# Patient Record
Sex: FEMALE | Race: WHITE | NOT HISPANIC OR LATINO | Employment: FULL TIME | URBAN - METROPOLITAN AREA
[De-identification: names, ages, dates, MRNs, and addresses within clinical notes are randomized per-mention and may not be internally consistent; named-entity substitution may affect disease eponyms.]

---

## 2019-08-22 ENCOUNTER — PATIENT OUTREACH (OUTPATIENT)
Dept: SURGERY | Facility: OTHER | Age: 59
End: 2019-08-22

## 2019-08-22 NOTE — PROGRESS NOTES
Client [address] closing client from case management today 08 22 2019 due to no face to face contact in over a year

## 2019-09-05 ENCOUNTER — PATIENT OUTREACH (OUTPATIENT)
Dept: SURGERY | Facility: OTHER | Age: 59
End: 2019-09-05

## 2019-09-05 NOTE — PROGRESS NOTES
Client came to [address] [place] [address] office to complete re-intake  Cm has previously closed client due to lost to follow up, and no face to face contact in over a year  Ct is a 59year old white, heterosexual, woman who contracted HIV from her now    Ct was diagnosed in   Ct is still going to the [facility] for HIV care and states is currently taking three pills for HIV, but her doctor wants to change that to one  Ct states her doctor is waiting on ct's labs to make the medication change  Ct saw Dr Josr Banda and had labs done last week  Cm will request ct's latest labs from the [facility]  Cm and ct worked on [facility] application and cm will submit it for renewal  Ct states is in stable medical health, has no oral health concerns, no addictions, and no mental health concerns  Cm offered to refer ct to a dentist and vision doctor since it has been years since ct saw one, but ct stated she will schedule herself when ready  Ct is abstaining from risky behaviors  Ct still works at [employer] full time and has active dental, vision, medical coverage through her job ([employer])  Ct rents a room in her sisters home and pays $400/mo  Ct has no domestic violence concerns, is independent in ADL, and has a good support system  Ct has a car  Ct has no legal or nutrition issues, and has no issues with language comprehension  Ct stated she has the full week off her job and has been going to places with her family  Re-intake is complete  See media for docs and Kalyn Bernstein Form

## 2019-09-06 ENCOUNTER — PATIENT OUTREACH (OUTPATIENT)
Dept: SURGERY | Facility: OTHER | Age: 59
End: 2019-09-06

## 2019-09-12 ENCOUNTER — HOSPITAL ENCOUNTER (EMERGENCY)
Facility: HOSPITAL | Age: 59
Discharge: HOME | End: 2019-09-12
Attending: EMERGENCY MEDICINE | Admitting: EMERGENCY MEDICINE
Payer: COMMERCIAL

## 2019-09-12 VITALS
WEIGHT: 155 LBS | OXYGEN SATURATION: 97 % | TEMPERATURE: 98 F | SYSTOLIC BLOOD PRESSURE: 156 MMHG | RESPIRATION RATE: 18 BRPM | BODY MASS INDEX: 24.33 KG/M2 | HEIGHT: 67 IN | HEART RATE: 60 BPM | DIASTOLIC BLOOD PRESSURE: 69 MMHG

## 2019-09-12 DIAGNOSIS — I16.0 HYPERTENSIVE URGENCY: Primary | ICD-10-CM

## 2019-09-12 PROCEDURE — 99284 EMERGENCY DEPT VISIT MOD MDM: CPT | Performed by: EMERGENCY MEDICINE

## 2019-09-12 PROCEDURE — 99283 EMERGENCY DEPT VISIT LOW MDM: CPT

## 2019-09-12 RX ORDER — CLOPIDOGREL BISULFATE 75 MG/1
75 TABLET ORAL DAILY
COMMUNITY

## 2019-09-12 RX ORDER — AMLODIPINE BESYLATE AND BENAZEPRIL HYDROCHLORIDE 5; 10 MG/1; MG/1
1 CAPSULE ORAL DAILY
Qty: 30 CAPSULE | Refills: 0 | Status: SHIPPED | OUTPATIENT
Start: 2019-09-12

## 2019-09-12 RX ORDER — AMLODIPINE BESYLATE 5 MG/1
5 TABLET ORAL DAILY
Status: DISCONTINUED | OUTPATIENT
Start: 2019-09-13 | End: 2019-09-12 | Stop reason: HOSPADM

## 2019-09-12 RX ORDER — LISINOPRIL 10 MG/1
10 TABLET ORAL DAILY
Status: DISCONTINUED | OUTPATIENT
Start: 2019-09-13 | End: 2019-09-12 | Stop reason: HOSPADM

## 2019-09-12 RX ADMIN — LISINOPRIL 10 MG: 10 TABLET ORAL at 21:06

## 2019-09-12 RX ADMIN — AMLODIPINE BESYLATE 5 MG: 5 TABLET ORAL at 20:51

## 2019-09-13 NOTE — ED PROVIDER NOTES
History  Chief Complaint   Patient presents with    Hypertension     Per pt - c/o head pressure - reports hypertention  Pt not currently on home medications  Patient is a 59-year-old female  She does not have a history of hypertension per se, though 2 weeks ago and she was at her doctor's office her blood pressure was elevated  Today she was feeling some pressure in her head so she took her blood pressure at the blood pressure machine at [facility]  It was found to be very elevated  She presented to the emergency room for evaluation  There is no chest pain or shortness of breath  No abdominal pain or back pain  No changes in urinary output  No confusion  There are no focal motor or sensory symptoms  No visual complaints  No speech complaints  The headache is not severe  It is more of a pressure  There have been no aggravating or relieving factors  Patient is a smoker  Prior to Admission Medications   Prescriptions Last Dose Informant Patient Reported? Taking? clopidogrel (PLAVIX) 75 mg tablet   Yes Yes   Sig: Take 75 mg by mouth daily   raltegravir (ISENTRESS) 400 mg tablet   Yes Yes   Sig: Take 400 mg by mouth every 12 (twelve) hours      Facility-Administered Medications: None       Past Medical History:   Diagnosis Date    HIV disease ([facility] )     PAD (peripheral artery disease) ([facility] )        Past Surgical History:   Procedure Laterality Date    HYSTERECTOMY      VASCULAR SURGERY         History reviewed  No pertinent family history  I have reviewed and agree with the history as documented  Social History     Tobacco Use    Smoking status: Current Every Day Smoker     Packs/day: 1 00     Types: Cigarettes    Smokeless tobacco: Never Used   Substance Use Topics    Alcohol use: Not Currently    Drug use: Yes     Types: Marijuana        Review of Systems   Constitutional: Negative for chills and fever  HENT: Negative for rhinorrhea and sore throat      Eyes: Negative for pain, redness and visual disturbance  Respiratory: Negative for cough and shortness of breath  Cardiovascular: Negative for chest pain and leg swelling  Gastrointestinal: Negative for abdominal pain, diarrhea and vomiting  Endocrine: Negative for polydipsia and polyuria  Genitourinary: Negative for dysuria, frequency, hematuria, vaginal bleeding and vaginal discharge  Musculoskeletal: Negative for neck pain  There is a history of sciatica  Skin: Negative for rash and wound  Allergic/Immunologic: Negative for immunocompromised state  Neurological: Negative for weakness, numbness and headaches  Hematological: Does not bruise/bleed easily  Psychiatric/Behavioral: Negative for hallucinations and suicidal ideas  Physical Exam  Physical Exam   Constitutional: She is oriented to person, place, and time  She appears well-developed and well-nourished  No distress  HENT:   Head: Normocephalic and atraumatic  Mouth/Throat: Oropharynx is clear and moist    Eyes: Pupils are equal, round, and reactive to light  Conjunctivae and EOM are normal  Right eye exhibits no discharge  Left eye exhibits no discharge  No scleral icterus  Neck: Normal range of motion  Neck supple  Cardiovascular: Normal rate, regular rhythm, normal heart sounds and intact distal pulses  Exam reveals no gallop and no friction rub  No murmur heard  Pulmonary/Chest: Effort normal and breath sounds normal  No stridor  No respiratory distress  She has no wheezes  She has no rales  Abdominal: Soft  Bowel sounds are normal  She exhibits no distension  There is no tenderness  There is no rebound and no guarding  No pulsatile mass  Musculoskeletal: Normal range of motion  She exhibits no edema, tenderness or deformity  No CVA tenderness  No calf tenderness/palpable cords  Neurological: She is alert and oriented to person, place, and time  She has normal strength  No sensory deficit  GCS eye subscore is 4   GCS verbal subscore is 5  GCS motor subscore is 6  Skin: Skin is warm and dry  No rash noted  Psychiatric: She has a normal mood and affect  Her behavior is normal    Vitals reviewed  Vital Signs  ED Triage Vitals [09/12/19 1804]   Temperature Pulse Respirations Blood Pressure SpO2   98 °F (36 7 °C) 83 19 (!) 213/91 100 %      Temp Source Heart Rate Source Patient Position - Orthostatic VS BP Location FiO2 (%)   Oral Monitor Sitting Left arm --      Pain Score       --           Vitals:    09/12/19 1804 09/12/19 2015   BP: (!) 213/91 156/69   Pulse: 83 60   Patient Position - Orthostatic VS: Sitting Lying         Visual Acuity      ED Medications  Medications   amLODIPine (NORVASC) tablet 5 mg (has no administration in time range)   lisinopril (ZESTRIL) tablet 10 mg (has no administration in time range)       Diagnostic Studies  Results Reviewed     None                 No orders to display              Procedures  Procedures       ED Course                               MDM  Number of Diagnoses or Management Options  Diagnosis management comments: There are no signs or symptoms of malignant hypertension  This is likely hypertensive urgency  Blood pressure did improve spontaneously on arrival to the emergency room currently it is 024 systolic  Disposition  Final diagnoses:   Hypertensive urgency     Time reflects when diagnosis was documented in both MDM as applicable and the Disposition within this note     Time User Action Codes Description Comment    9/12/2019  8:26 PM Everrett Acron Add [I10] Hypertension     9/12/2019  8:26 PM Everrett Acron Remove [I10] Hypertension     9/12/2019  8:26 PM Everrett Acron Add [I16 0] Hypertensive urgency       ED Disposition     ED Disposition Condition Date/Time Comment    Discharge Stable Thu Sep 12, 2019  8:26 PM Christal Dickinson discharge to home/self care              Follow-up Information     Follow up With Specialties Details Why Contact [facility] In 1 week  [address] [id number]  [phone]            Patient's Medications   Discharge Prescriptions    AMLODIPINE-BENAZEPRIL (LOTREL 5-10) 5-10 MG PER CAPSULE    Take 1 capsule by mouth daily       Start Date: 9/12/2019 End Date: --       Order Dose: 1 capsule       Quantity: 30 capsule    Refills: 0     No discharge procedures on file      ED Provider  Electronically Signed by           Bijal Adams MD  09/12/19 2027

## 2019-09-20 ENCOUNTER — PATIENT OUTREACH (OUTPATIENT)
Dept: SURGERY | Facility: OTHER | Age: 59
End: 2019-09-20

## 2019-09-20 NOTE — PROGRESS NOTES
Cm met with Sister Hugo Farnsworth at the [facility]  Sister Hugo Farnsworth asked if cm submitted a SPBP renewal neri for ct, and cm stated yes ct should have a new card by now  Sister Hugo Farnsworth stated ct was at the ER for high blood pressure

## 2019-10-18 ENCOUNTER — PATIENT OUTREACH (OUTPATIENT)
Dept: SURGERY | Facility: OTHER | Age: 59
End: 2019-10-18

## 2019-10-18 NOTE — PROGRESS NOTES
Ct called stating her SPBP coverage was denied and she would like to come in to [facility] office to complete another application  Ct stated she has off next Thursday  Cm scheduled appt for next Thursday at 3 pm  Cm then called [employer] to find out why the application was denied  SPBP rep Zuleyma Smallwood stated ct does not have to submit an entire application all over, they just need proof of prescription coverage and proof of address to process the neri  Zuleyma Smallwood stated if ct cannot find proof of address cm can write a letter ct lives at current address  Cm called ct and explained what she needs to bring in to appt  Ct stated she sent proof of address and prescription coverage to them weeks ago  Cm stated that sometimes when submitted by mail paperwork gets lost and when cm spoke to [facility] rep today she said they still need the paperwork above  Ct stated she will bring it in on Thursday so cm can fax it out to [facility] directly

## 2019-10-24 ENCOUNTER — PATIENT OUTREACH (OUTPATIENT)
Dept: SURGERY | Facility: OTHER | Age: 59
End: 2019-10-24

## 2019-10-24 NOTE — PROGRESS NOTES
Ct came to [facility] office to provide cm with proof of address and prescription medication, so cm can submit to [facility]  Ct provided all required documentation, and cm faxed it to [facility] successfully  Cm will call [employer] tomorrow to make sure they have everything they need to reinstate ct's coverage  See media

## 2019-10-25 ENCOUNTER — PATIENT OUTREACH (OUTPATIENT)
Dept: SURGERY | Facility: OTHER | Age: 59
End: 2019-10-25

## 2019-10-25 NOTE — PROGRESS NOTES
Cm called [employer] to find out if they have received ct's additional docs and if anything else needs to be submitted to reinstate ct's coverage  Amanda stated since diaz submitted the paperwork later in the afternoon, they have not reviewed it yet, and cm can call back to Monday  Diaz called ct and stated she will call SPBP on Monday

## 2019-10-28 ENCOUNTER — PATIENT OUTREACH (OUTPATIENT)
Dept: SURGERY | Facility: OTHER | Age: 59
End: 2019-10-28

## 2019-10-28 NOTE — PROGRESS NOTES
Diaz called [employer] to check on ct's status  Natalee stated that ct is considered to be making too much money due to her over time pay and stated cm should submit her W2 or 1040 form for 2018  Also Natalee stated for cm to send in ct's medical insurance card again  Diaz stated the insurance card was faxed to [facility] last Thursday,but Natalee could not locate it  Natalee provided cm with another fax number to submit the additional docs to  Natalee stated there is no need to re-submit the entire application  Cm called ct and left message to find her W2 and 1040 and to submit to  asap

## 2019-10-29 ENCOUNTER — PATIENT OUTREACH (OUTPATIENT)
Dept: SURGERY | Facility: OTHER | Age: 59
End: 2019-10-29

## 2019-10-29 NOTE — PROGRESS NOTES
Cm left another message on ct's phone to try and bring in her W2s or 1040 Form to cm as soon as possible, so cm can submit them to [employer]

## 2019-10-30 ENCOUNTER — PATIENT OUTREACH (OUTPATIENT)
Dept: SURGERY | Facility: OTHER | Age: 59
End: 2019-10-30

## 2019-10-30 NOTE — PROGRESS NOTES
Ct stated she looked for her W2s and 1040 Form and can come in tomorrow to bring them in, so cm can submit them to [employer]  Cm went over what [employer] rep told cm in detail  Ct scheduled appt for 3 pm tomorrow

## 2019-10-31 ENCOUNTER — PATIENT OUTREACH (OUTPATIENT)
Dept: SURGERY | Facility: OTHER | Age: 59
End: 2019-10-31

## 2019-10-31 NOTE — PROGRESS NOTES
Client came to [facility] office to drop off her W2 form  Cm faxed W2 form, proof of address, and proof of medical insurance directly to [address] [place] [address], with a note stating for rep to call cm if anything else is needed  Client stated she has an upcoming appt with her PCP next Tuesday, but has been sick for over two weeks now with flu like symptoms, running a low grade fever  Client states the flu like symptoms are starting to go away  Cm stated if symptoms worsen ct should go to the ER  See [facility] fax and SCP

## 2019-11-01 ENCOUNTER — PATIENT OUTREACH (OUTPATIENT)
Dept: SURGERY | Facility: OTHER | Age: 59
End: 2019-11-01

## 2019-11-01 NOTE — PROGRESS NOTES
Cm had voicemail from [address] [place] [address] regarding ct's benefits  Cm returned the call and Natalee stated she received all documentation for ct's application, however ct's medical insurance card does not indicate that ct has prescription coverage  Cm stated that cm had noticed that and had asked ct if she has additional proof of drug prescription coverage insurance, but ct stated that she only has this card, she has had it for over three years and it does cover prescription medication, but ct does have to pay co-pays and deductibles ranging from $ when purchasing her meds  Natalee asked cm to scan the card and e-mail the copy to [email or web address], so it's more legible  Cm scanned the card, and e-mailed it to [facility] stating "Natalee,  I scanned the card, but it does not seem like the phone number in the back is legible  It says For assistance with patient logistics and benefits, contact   Let me know if you need anything else  Thank you!"  See media for medical insurance card

## 2019-11-05 ENCOUNTER — PATIENT OUTREACH (OUTPATIENT)
Dept: SURGERY | Facility: OTHER | Age: 59
End: 2019-11-05

## 2019-11-05 NOTE — PROGRESS NOTES
Natalee from [facility] called and stated ct was approved and has active coverage  Natalee stated they were able to figure out what c';s prescription insurance coverage is and that it is called [employer]  Natalee stated cm should suggest to ct to obtain a card from [employer], so ct does not face the same problems in the future  Cm called ct and explained she has active SPBP coverage, and that SPBP rep suggested ct request a card from [employer] to avoid future complications

## 2020-02-20 ENCOUNTER — PATIENT OUTREACH (OUTPATIENT)
Dept: SURGERY | Facility: OTHER | Age: 60
End: 2020-02-20

## 2020-04-01 ENCOUNTER — PATIENT OUTREACH (OUTPATIENT)
Dept: SURGERY | Facility: OTHER | Age: 60
End: 2020-04-01

## 2020-04-08 ENCOUNTER — PATIENT OUTREACH (OUTPATIENT)
Dept: SURGERY | Facility: OTHER | Age: 60
End: 2020-04-08

## 2020-05-13 ENCOUNTER — PATIENT OUTREACH (OUTPATIENT)
Dept: SURGERY | Facility: OTHER | Age: 60
End: 2020-05-13

## 2020-05-15 ENCOUNTER — PATIENT OUTREACH (OUTPATIENT)
Dept: SURGERY | Facility: OTHER | Age: 60
End: 2020-05-15

## 2020-05-21 ENCOUNTER — PATIENT OUTREACH (OUTPATIENT)
Dept: SURGERY | Facility: OTHER | Age: 60
End: 2020-05-21

## 2020-06-01 ENCOUNTER — PATIENT OUTREACH (OUTPATIENT)
Dept: SURGERY | Facility: OTHER | Age: 60
End: 2020-06-01

## 2020-06-15 ENCOUNTER — PATIENT OUTREACH (OUTPATIENT)
Dept: SURGERY | Facility: OTHER | Age: 60
End: 2020-06-15

## 2020-06-16 ENCOUNTER — PATIENT OUTREACH (OUTPATIENT)
Dept: SURGERY | Facility: OTHER | Age: 60
End: 2020-06-16

## 2020-06-19 ENCOUNTER — PATIENT OUTREACH (OUTPATIENT)
Dept: SURGERY | Facility: OTHER | Age: 60
End: 2020-06-19

## 2020-06-22 ENCOUNTER — PATIENT OUTREACH (OUTPATIENT)
Dept: SURGERY | Facility: OTHER | Age: 60
End: 2020-06-22

## 2020-06-24 ENCOUNTER — PATIENT OUTREACH (OUTPATIENT)
Dept: SURGERY | Facility: OTHER | Age: 60
End: 2020-06-24

## 2020-07-09 ENCOUNTER — PATIENT OUTREACH (OUTPATIENT)
Dept: SURGERY | Facility: OTHER | Age: 60
End: 2020-07-09

## 2020-07-09 NOTE — PROGRESS NOTES
Sister Caitlin Ochoa from [facility] called stating client had a virtual appt with her and Dr Viktoria Campbell  [facility] stated client is doing good, and is in  good mental health   Client is adherent to her HIV medication  [facility] Caitlin [facility] stated that client mentioned that she appreciates all of cm's help

## 2020-10-09 ENCOUNTER — PATIENT OUTREACH (OUTPATIENT)
Dept: SURGERY | Facility: OTHER | Age: 60
End: 2020-10-09

## 2020-11-10 ENCOUNTER — PATIENT OUTREACH (OUTPATIENT)
Dept: SURGERY | Facility: OTHER | Age: 60
End: 2020-11-10

## 2020-11-17 ENCOUNTER — PATIENT OUTREACH (OUTPATIENT)
Dept: SURGERY | Facility: OTHER | Age: 60
End: 2020-11-17

## 2020-11-24 ENCOUNTER — PATIENT OUTREACH (OUTPATIENT)
Dept: SURGERY | Facility: OTHER | Age: 60
End: 2020-11-24

## 2020-11-25 ENCOUNTER — PATIENT OUTREACH (OUTPATIENT)
Dept: SURGERY | Facility: OTHER | Age: 60
End: 2020-11-25

## 2020-12-02 ENCOUNTER — PATIENT OUTREACH (OUTPATIENT)
Dept: SURGERY | Facility: OTHER | Age: 60
End: 2020-12-02

## 2020-12-03 ENCOUNTER — PATIENT OUTREACH (OUTPATIENT)
Dept: SURGERY | Facility: OTHER | Age: 60
End: 2020-12-03

## 2020-12-08 ENCOUNTER — PATIENT OUTREACH (OUTPATIENT)
Dept: SURGERY | Facility: OTHER | Age: 60
End: 2020-12-08

## 2021-04-28 ENCOUNTER — PATIENT OUTREACH (OUTPATIENT)
Dept: SURGERY | Facility: OTHER | Age: 61
End: 2021-04-28

## 2021-05-11 ENCOUNTER — PATIENT OUTREACH (OUTPATIENT)
Dept: SURGERY | Facility: OTHER | Age: 61
End: 2021-05-11

## 2021-05-11 NOTE — PROGRESS NOTES
Cm left message on ct's phone to call and schedule RCT appt  Ct called and scheduled RCT appt for Monday, May 17th at 10 am  Cm went over required docs

## 2021-05-17 ENCOUNTER — PATIENT OUTREACH (OUTPATIENT)
Dept: SURGERY | Facility: OTHER | Age: 61
End: 2021-05-17

## 2021-05-17 NOTE — PROGRESS NOTES
Ct came to [facility] office to complete RCT,RST  Due to COVID 19 no signatures were collected at this time  Ct stated she is in good health, and adherent to HIV care  Ct stated she got her labs done today for her future visit with the [facility]  Ct stated she has not scheduled an in person appointment yet,but has been in touch with Sister Jessica Rosa and will make her way to [facility] soon  Cm will request ct's latest labs from the [facility] in a couple of days  Ct has no oral health concerns,but has not seen a dentist in years  Cm educated ct on dental health importance  Ct has her own dental insurance and will schedule dental visit herself when ready  Ct has no mental health concerns, and no addictions  Ct is abstaining from risky behaviors  Ct lives with her son and his girlfriend  Ct has no domestic violence concerns  Ct has a car, and works full time at [employer]  Ct has active private insurance, and active SPBP coverage  Cm and ct completed SPBP renewal application, and cm will submit it to [facility] before it expires on 6/30/2021  Ct has no language comprehension issues, no legal issues , and no issues with nutrition  Ct is independent in ADL  Cm completed [employer] Acuity Scale  See media for [employer] and docs   Recertification and Reassessment is complete

## 2021-06-01 ENCOUNTER — PATIENT OUTREACH (OUTPATIENT)
Dept: SURGERY | Facility: OTHER | Age: 61
End: 2021-06-01

## 2021-06-02 ENCOUNTER — PATIENT OUTREACH (OUTPATIENT)
Dept: SURGERY | Facility: OTHER | Age: 61
End: 2021-06-02

## 2021-06-02 NOTE — PROGRESS NOTES
Cm received ct's labs from the [facility]  See [place]  Cm noticed the labs sent are from 2018  Cm called Sister Chuck Almodovar and explained cm requested latest labs for ct and was given labs from 2018  Sister Chuck Almodovar stated that Milena Monsalve no longer works there, and whoever faxed the labs probably did not know what to fax  Sister Chuck Almodovar told cm to call Sister Chuck Almodovar and request labs that way for future references  Cm said ok  Sister Chuck Almodovar will send ct's latest labs to cm later today or tomorrow

## 2021-06-11 ENCOUNTER — PATIENT OUTREACH (OUTPATIENT)
Dept: SURGERY | Facility: OTHER | Age: 61
End: 2021-06-11

## 2021-06-11 NOTE — PROGRESS NOTES
Cm had message from ct asking if cm has submitted client's renewal application  Cm called client back stating cm has not yet submitted ct's SPBP renewal application because ct's coverage expires at the end of June, and SPBP has asked cms to submit renewal applications after the 46MG of every month  Cm explained that cm has not forgotten about client's renewal application, cm is just waiting until Melida 15 to submit the renewal application  Cm explained that SPBP sends out 2 notice letters to all clients  Ct understood and said okay

## 2021-06-14 ENCOUNTER — PATIENT OUTREACH (OUTPATIENT)
Dept: SURGERY | Facility: OTHER | Age: 61
End: 2021-06-14

## 2021-06-14 NOTE — PROGRESS NOTES
Cm submitted ct's SPBP renewal application today  See media  Per ct's request,cm sent client text message stating her renewal application was submitted today

## 2021-06-25 ENCOUNTER — PATIENT OUTREACH (OUTPATIENT)
Dept: SURGERY | Facility: OTHER | Age: 61
End: 2021-06-25

## 2021-06-25 NOTE — PROGRESS NOTES
Ct called Sister Singh Bassett at the [facility] to request ct's latest labs since cm never received them  Sister Singh Bassett faxed ct's latest labs and stated ct has an in person appointment with her on 7/2/2021  Cm received ct's latest labs from the [facility]  See media

## 2021-10-11 ENCOUNTER — PATIENT OUTREACH (OUTPATIENT)
Dept: SURGERY | Facility: OTHER | Age: 61
End: 2021-10-11

## 2021-11-12 ENCOUNTER — PATIENT OUTREACH (OUTPATIENT)
Dept: SURGERY | Facility: CLINIC | Age: 61
End: 2021-11-12

## 2021-11-15 ENCOUNTER — PATIENT OUTREACH (OUTPATIENT)
Dept: SURGERY | Facility: CLINIC | Age: 61
End: 2021-11-15

## 2021-11-23 ENCOUNTER — PATIENT OUTREACH (OUTPATIENT)
Dept: SURGERY | Facility: CLINIC | Age: 61
End: 2021-11-23

## 2022-02-11 ENCOUNTER — PATIENT OUTREACH (OUTPATIENT)
Dept: SURGERY | Facility: OTHER | Age: 62
End: 2022-02-11

## 2022-02-16 ENCOUNTER — PATIENT OUTREACH (OUTPATIENT)
Dept: SURGERY | Facility: OTHER | Age: 62
End: 2022-02-16

## 2022-02-16 NOTE — PROGRESS NOTES
Sister Lakshmi Manley from the [facility] called and requested clients updated SARITA so she can fax out cts latest labs  Cm faxed the signed release to Louisa Johnson

## 2022-02-21 ENCOUNTER — PATIENT OUTREACH (OUTPATIENT)
Dept: SURGERY | Facility: OTHER | Age: 62
End: 2022-02-21

## 2022-02-21 NOTE — PROGRESS NOTES
Cm received ct's latest labs from the [facility]  [employer] [place]

## 2022-05-06 ENCOUNTER — PATIENT OUTREACH (OUTPATIENT)
Dept: SURGERY | Facility: OTHER | Age: 62
End: 2022-05-06

## 2022-05-06 NOTE — PROGRESS NOTES
Cm left message on ct's phone stating her RCT needs to be completed this month for ct to call and schedule an appt

## 2022-05-09 ENCOUNTER — PATIENT OUTREACH (OUTPATIENT)
Dept: SURGERY | Facility: OTHER | Age: 62
End: 2022-05-09

## 2022-05-12 ENCOUNTER — PATIENT OUTREACH (OUTPATIENT)
Dept: SURGERY | Facility: OTHER | Age: 62
End: 2022-05-12

## 2022-05-16 ENCOUNTER — PATIENT OUTREACH (OUTPATIENT)
Dept: SURGERY | Facility: OTHER | Age: 62
End: 2022-05-16

## 2022-05-16 NOTE — PROGRESS NOTES
Ct came to [facility] office to complete RCT,RST  Due to COVID 19 no signatures were collected at this time  Ct stated she is in good health, and adherent to HIV care  Ct stated she has to schedule an appt with the [facility] because she had to cancel her last appt due to inclement weather  Cm called Sister Bryce Calzada and requested ct's latest labs from the [facility]  Ct has no oral health concerns,but has not seen a dentist in over 10 years  Cm educated ct on dental health importance  Cm provided ct with Macario Bernal contact info  Ct will reach out to  when ready to schedule an appt  Ct has no mental health concerns, and no addictions  Ct is abstaining from risky behaviors  Davis Sorensen her son and his girlfriend  Ct has no domestic violence concerns  Ct has a car, and works full time at [employer]  Ct has active private insurance, and active SPBP coverage  Ct has no language comprehension issues, no legal issues, and no issues with nutrition  Ct is independent in ADL  Cm completed Jorge White Acuity Scale  See media for [employer] and docs   Recertification and Reassessment is complete

## 2022-05-17 ENCOUNTER — PATIENT OUTREACH (OUTPATIENT)
Dept: SURGERY | Facility: OTHER | Age: 62
End: 2022-05-17

## 2022-05-17 NOTE — PROGRESS NOTES
Cm received ct's latest labs from the [facility]  See media  Cm did not see ct's medication list included in labs  Cm reached out to Damaris Marroquin from the [facility]  Sister Josselyn Derrick stated ct is currently taking Biktarvy

## 2022-07-18 ENCOUNTER — PATIENT OUTREACH (OUTPATIENT)
Dept: SURGERY | Facility: OTHER | Age: 62
End: 2022-07-18

## 2022-07-18 NOTE — PROGRESS NOTES
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

## 2022-12-05 ENCOUNTER — PATIENT OUTREACH (OUTPATIENT)
Dept: SURGERY | Facility: OTHER | Age: 62
End: 2022-12-05

## 2022-12-08 NOTE — PROGRESS NOTES
Ct texted Cm requesting an appointment to complete SPBP and Recert  Cm scheduled Ct for 12/9/22 for an in person apt

## 2022-12-09 ENCOUNTER — PATIENT OUTREACH (OUTPATIENT)
Dept: SURGERY | Facility: OTHER | Age: 62
End: 2022-12-09

## 2022-12-09 NOTE — PROGRESS NOTES
Ct came in to do [facility] Application  Ct provided current income  Per ct no changes, residence, employer and private insuarance the same  Per ct sees Sister Chencho Elias and ID doctor in [place]  Cm and Ct completed SPBP application      Cm emailed full SPBP application and supporting documents to SPBP  See Media for [facility] Application

## 2022-12-13 ENCOUNTER — PATIENT OUTREACH (OUTPATIENT)
Dept: SURGERY | Facility: OTHER | Age: 62
End: 2022-12-13

## 2022-12-13 NOTE — PROGRESS NOTES
Cm completed assessment with Ct  Ct stated she is in good health and adherent to HIV care  Ct does not engage in any risky behaviors  Ct has no oral health concerns, but has not seen a dentist in over 10 years  Cm educated Ct on the importance of dental health care and provided Ct with Raleigh Bernal contact info  Ct has stable housing, a car, and is employed with [employer]

## 2022-12-15 ENCOUNTER — PATIENT OUTREACH (OUTPATIENT)
Dept: SURGERY | Facility: OTHER | Age: 62
End: 2022-12-15

## 2023-01-06 ENCOUNTER — PATIENT OUTREACH (OUTPATIENT)
Dept: SURGERY | Facility: OTHER | Age: 63
End: 2023-01-06

## 2023-02-16 ENCOUNTER — PATIENT OUTREACH (OUTPATIENT)
Dept: SURGERY | Facility: OTHER | Age: 63
End: 2023-02-16

## 2023-03-01 ENCOUNTER — PATIENT OUTREACH (OUTPATIENT)
Dept: SURGERY | Facility: OTHER | Age: 63
End: 2023-03-01

## 2023-05-31 ENCOUNTER — PATIENT OUTREACH (OUTPATIENT)
Dept: SURGERY | Facility: OTHER | Age: 63
End: 2023-05-31

## 2023-05-31 NOTE — PROGRESS NOTES
Cm left Ct a detailed voicemail requesting to complete her Recert  Cm included required documents in voicemail

## 2023-09-12 ENCOUNTER — PATIENT OUTREACH (OUTPATIENT)
Dept: SURGERY | Facility: OTHER | Age: 63
End: 2023-09-12

## 2023-10-04 ENCOUNTER — PATIENT OUTREACH (OUTPATIENT)
Dept: SURGERY | Facility: OTHER | Age: 63
End: 2023-10-04

## 2023-10-04 NOTE — PROGRESS NOTES
Cm received a text message from Ct requesting to come in to [place] office to complete her Recert.  Ct scheduled for Friday 10/20/2023 at 10:00 am.

## 2023-10-19 ENCOUNTER — PATIENT OUTREACH (OUTPATIENT)
Dept: SURGERY | Facility: OTHER | Age: 63
End: 2023-10-19

## 2023-10-19 NOTE — PROGRESS NOTES
Diaz sent a friendly reminder text message to Ct of her upcoming Recert apt tomorrow at 10 am. Cm also reminded Ct to bring her documents.

## 2023-10-20 ENCOUNTER — PATIENT OUTREACH (OUTPATIENT)
Dept: SURGERY | Facility: OTHER | Age: 63
End: 2023-10-20

## 2023-10-20 NOTE — PROGRESS NOTES
Ct came to the [place] office to complete her Recert. Ct reports to have a complete understanding of HIV disease process, transmission, and medications. Ct stated she last saw Sister Demarco Mendoza 6+ months ago and that her last apt was cancelled by [facility] due to a holiday conflict. Ct stated she will call [facility] to make a new apt. Ct reports to be taking Biktarvy and that she is undetectable. Ct and Cm discussed Ct possibly trying a new ID doctor/PCP due to [place] being such a far ride. Ct and Cm explored options and Ct will call Cm if/when she is ready to proceed. Cm reports to have a fear of the dentist and has not been to dentist in multiple years, however does not report any oral issues. Cm educated Ct on the importance or oral health care and Ct denied referral. Cm noticed that Ct will turn 63 in December. Cm and Ct discussed Medicare and Cm provided Ct with  Ehsannanette Lutz and suggested she reach out to her asap. Cm and Ct completed Acuity Scale. Ct stated that Sister Demarco Mendoza has requested certain testing referrals for Ct, but the doctors are far from [id number] [facility]. Cm informed Ct to reach out to Cm and Cm will assist Ct in obtaining a Dr for testing, closer to home. Ct stated she is not sexually active. Ct stated that her SPBP will  in December and Cm reminded Ct to call Cm once she receives mail from [facility]. Cm also provided Ct with services provided to Ct. Ct stated she was not aware of all the services [facility] offered to her. Ct has stable housing, no substance use difficulties and no mental health history. Cm sent a request for Cts labs to Alexandria Navarro.      See media

## 2023-10-30 ENCOUNTER — PATIENT OUTREACH (OUTPATIENT)
Dept: SURGERY | Facility: OTHER | Age: 63
End: 2023-10-30

## 2023-11-06 ENCOUNTER — PATIENT OUTREACH (OUTPATIENT)
Dept: SURGERY | Facility: OTHER | Age: 63
End: 2023-11-06

## 2023-11-06 NOTE — PROGRESS NOTES
Cm received a phone call from Demarco Schwartz from [facility] wanting to update Cm that Ct is in need of a new PCP closer to her home.

## 2023-11-29 ENCOUNTER — PATIENT OUTREACH (OUTPATIENT)
Dept: SURGERY | Facility: OTHER | Age: 63
End: 2023-11-29

## 2024-02-15 ENCOUNTER — PATIENT OUTREACH (OUTPATIENT)
Dept: SURGERY | Facility: OTHER | Age: 64
End: 2024-02-15

## 2024-02-15 NOTE — PROGRESS NOTES
Cm received a text message from Ct asking if [employer] can assist with her propane bill. Cm explained she will submit a request to [facility] and informed Ct to expect a call from a HC. Cm completed housing referral request and submitted to [facility]. Cm also suggested for Ct to call 211 for additional resources.     See media

## 2024-02-27 ENCOUNTER — PATIENT OUTREACH (OUTPATIENT)
Dept: SURGERY | Facility: OTHER | Age: 64
End: 2024-02-27

## 2024-03-04 ENCOUNTER — PATIENT OUTREACH (OUTPATIENT)
Dept: SURGERY | Facility: CLINIC | Age: 64
End: 2024-03-04

## 2024-03-11 ENCOUNTER — PATIENT OUTREACH (OUTPATIENT)
Dept: SURGERY | Facility: OTHER | Age: 64
End: 2024-03-11

## 2024-03-12 ENCOUNTER — PATIENT OUTREACH (OUTPATIENT)
Dept: SURGERY | Facility: OTHER | Age: 64
End: 2024-03-12

## 2024-03-12 NOTE — PROGRESS NOTES
HC called ct and left a detailed voicemail about her needing assistance with her bill for heating.

## 2024-04-17 ENCOUNTER — PATIENT OUTREACH (OUTPATIENT)
Dept: SURGERY | Facility: OTHER | Age: 64
End: 2024-04-17

## 2024-05-24 ENCOUNTER — PATIENT OUTREACH (OUTPATIENT)
Dept: SURGERY | Facility: OTHER | Age: 64
End: 2024-05-24

## 2024-05-24 NOTE — PROGRESS NOTES
Cm received a phone call from Sister Molly from [facility]. SR stated her concerns regarding Ct obtaining a PCP closer to her home with [facility] due to the possibility of lung cancer. Cm to reah out to Ct to see if she would like assistance obtaining a [facility] PCP.

## 2024-05-29 ENCOUNTER — PATIENT OUTREACH (OUTPATIENT)
Dept: SURGERY | Facility: OTHER | Age: 64
End: 2024-05-29

## 2024-05-29 NOTE — PROGRESS NOTES
Diaz received a phone call from Sister Molly from [facility] stating she spoke with Ct yesterday regarding her finding a new PCP closer to her home. Diaz then sent a text message to Ct informing her to reach out to Cm if she may need any assistance.

## 2024-07-01 ENCOUNTER — PATIENT OUTREACH (OUTPATIENT)
Dept: SURGERY | Facility: OTHER | Age: 64
End: 2024-07-01

## 2024-07-01 NOTE — PROGRESS NOTES
Cm received a phone call from Sister Molly from [facility] providing Cm with update that Ct does have cancer and will be starting chemo soon. Cm informed SR that Cm has reached out to Ct informing Ct to please reach out to Cm if Ct may need anything.

## 2024-10-22 ENCOUNTER — PATIENT OUTREACH (OUTPATIENT)
Dept: SURGERY | Facility: OTHER | Age: 64
End: 2024-10-22

## 2024-11-01 ENCOUNTER — PATIENT OUTREACH (OUTPATIENT)
Dept: SURGERY | Facility: OTHER | Age: 64
End: 2024-11-01

## 2024-11-01 NOTE — PROGRESS NOTES
Ct came to the [place] office to complete her Recert. Ct reports to have a complete understanding of HIV disease process, transmission, and medications. Ct stated she is no longer able to travel to [facility] to see Sister Molly and that she started seeing Dr. Jay Skelton [facility]. Ct stated she has seen him once and that if  Needs to refer her to ID doctor he will, but for now he will continue to prescribe her Biktarvy. Ct reports to be taking Biktarvy and that she is undetectable. Ct stated she had an apt with [facility]ist, however due to cancer treatment apt Ct had to cancel. Ct stated that the office was very rude to her and she will not be going there. Cm educated Ct on the importance or oral health care and Cm offered assistance in obtaining a new dentist. Ct stated she now has Medicare part A, insurance through her employer and [facility] [employer]. Ct stated she received a letter from [employer] [facility] [employer] stating [employer] choice would be the best plan for Ct. Cm informed Ct to keep a look out for a packet in the mail from [employer] [facility] [employer] [place] [employer]. Ct to contact Cm once she receives application. Ct has stable housing, no substance use difficulties and no mental health history. Ct stated she has some medical bills from [facility]. Cm and Ct discussed Ct applying for [facility] financial assistance program. Ct stated she will think about it and get back to . Cm and Ct completed Acuity Scale. Cm sent a request for Cts labs to Dr. Skelton. Recert completed.    See media.

## 2024-11-26 ENCOUNTER — PATIENT OUTREACH (OUTPATIENT)
Dept: SURGERY | Facility: OTHER | Age: 64
End: 2024-11-26

## 2024-11-26 NOTE — PROGRESS NOTES
Cm called Cts [facility] Dr. Gonzalez to request Cts most recent labs including Cts Viral Load, Cd4, and medication list. Ct received Cts labs and updated CW.    See media.

## 2024-12-03 ENCOUNTER — PATIENT OUTREACH (OUTPATIENT)
Dept: SURGERY | Facility: OTHER | Age: 64
End: 2024-12-03

## 2024-12-05 NOTE — PROGRESS NOTES
Cm sent Ct a follow up text message to inquire if she has heard anything regarding her propane bill.    During cystoscopy the following was utilized on patient with no adverse affects:    45% SODIUM CHLORIDE 500 ML BAG  Lot number: [id number]  Expiration date: 07/2025      LIDOCAINE HYDROCHLORIDE JELLY 2%   Lot number: [id number]  Expiration date: 04/2026    CYSTOSCOPE   Lot number: [id number]  Expiration date: 10/27/2026

## 2024-12-12 ENCOUNTER — PATIENT OUTREACH (OUTPATIENT)
Dept: SURGERY | Facility: CLINIC | Age: 64
End: 2024-12-12

## 2024-12-13 ENCOUNTER — PATIENT OUTREACH (OUTPATIENT)
Dept: SURGERY | Facility: OTHER | Age: 64
End: 2024-12-13

## 2024-12-13 NOTE — PROGRESS NOTES
Ct came to the [place] office for assistance with [facility] renewal neri. Cm and Ct completed renewal neri and Cm submitted to [facility] via email.    See media.

## 2025-02-12 ENCOUNTER — PATIENT OUTREACH (OUTPATIENT)
Dept: SURGERY | Facility: OTHER | Age: 65
End: 2025-02-12

## 2025-06-26 ENCOUNTER — PATIENT OUTREACH (OUTPATIENT)
Dept: SURGERY | Facility: OTHER | Age: 65
End: 2025-06-26

## 2025-07-09 ENCOUNTER — PATIENT OUTREACH (OUTPATIENT)
Dept: SURGERY | Facility: CLINIC | Age: 65
End: 2025-07-09

## 2025-07-09 NOTE — PROGRESS NOTES
Cm completed ct 6 month assessment and updated ct goals. Ct states she does not have primary dental provider but saw a dentist about 1.5 years ago. Ct states she has a fear of dentists and does not want a referral to one at this time.     Cm faxed lab request to ct primary doctor

## 2025-07-15 ENCOUNTER — PATIENT OUTREACH (OUTPATIENT)
Dept: SURGERY | Facility: CLINIC | Age: 65
End: 2025-07-15

## 2025-07-23 ENCOUNTER — PATIENT OUTREACH (OUTPATIENT)
Dept: SURGERY | Facility: CLINIC | Age: 65
End: 2025-07-23